# Patient Record
Sex: MALE | Race: OTHER | HISPANIC OR LATINO | URBAN - METROPOLITAN AREA
[De-identification: names, ages, dates, MRNs, and addresses within clinical notes are randomized per-mention and may not be internally consistent; named-entity substitution may affect disease eponyms.]

---

## 2022-03-29 ENCOUNTER — EMERGENCY (EMERGENCY)
Facility: HOSPITAL | Age: 57
LOS: 0 days | Discharge: HOME | End: 2022-03-29
Attending: EMERGENCY MEDICINE | Admitting: EMERGENCY MEDICINE
Payer: SELF-PAY

## 2022-03-29 VITALS
SYSTOLIC BLOOD PRESSURE: 158 MMHG | TEMPERATURE: 98 F | RESPIRATION RATE: 18 BRPM | DIASTOLIC BLOOD PRESSURE: 78 MMHG | WEIGHT: 175.05 LBS | HEART RATE: 85 BPM | OXYGEN SATURATION: 100 % | HEIGHT: 67 IN

## 2022-03-29 DIAGNOSIS — Z91.013 ALLERGY TO SEAFOOD: ICD-10-CM

## 2022-03-29 DIAGNOSIS — Z23 ENCOUNTER FOR IMMUNIZATION: ICD-10-CM

## 2022-03-29 DIAGNOSIS — S41.111A LACERATION WITHOUT FOREIGN BODY OF RIGHT UPPER ARM, INITIAL ENCOUNTER: ICD-10-CM
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
## 2022-03-29 DIAGNOSIS — S60.511A ABRASION OF RIGHT HAND, INITIAL ENCOUNTER: ICD-10-CM
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
## 2022-03-29 PROCEDURE — 12002 RPR S/N/AX/GEN/TRNK2.6-7.5CM: CPT

## 2022-03-29 PROCEDURE — 99283 EMERGENCY DEPT VISIT LOW MDM: CPT | Mod: 25

## 2022-03-29 RX ORDER — TETANUS TOXOID, REDUCED DIPHTHERIA TOXOID AND ACELLULAR PERTUSSIS VACCINE, ADSORBED 5; 2.5; 8; 8; 2.5 [IU]/.5ML; [IU]/.5ML; UG/.5ML; UG/.5ML; UG/.5ML
0.5 SUSPENSION INTRAMUSCULAR ONCE
Refills: 0 | Status: COMPLETED | OUTPATIENT
Start: 2022-03-29 | End: 2022-03-29

## 2022-03-29 RX ADMIN — TETANUS TOXOID, REDUCED DIPHTHERIA TOXOID AND ACELLULAR PERTUSSIS VACCINE, ADSORBED 0.5 MILLILITER(S): 5; 2.5; 8; 8; 2.5 SUSPENSION INTRAMUSCULAR at 10:51

## 2022-03-29 RX ADMIN — Medication 1 TABLET(S): at 10:49

## 2022-03-29 NOTE — ED PROVIDER NOTE - NSFOLLOWUPINSTRUCTIONS_ED_ALL_ED_FT
Please come in 10-14 days to take out sutures    Laceration Care, Adult      A laceration is a cut that may go through all layers of the skin and into the tissue that is right under the skin. Some lacerations heal on their own. Others need to be closed with stitches (sutures), staples, skin adhesive strips, or skin glue. Proper care of a laceration reduces the risk for infection, helps the laceration heal better, and may prevent scarring.      How to care for your laceration    Wash your hands with soap and water before touching your wound or changing your bandage (dressing). If soap and water are not available, use hand .    Keep the wound clean and dry.    If you were given a dressing, you should change it at least once a day, or as told by your health care provider. You should also change it if it becomes wet or dirty.    If sutures or staples were used:     •Keep the wound completely dry for the first 24 hours, or as told by your health care provider. After that time, you may shower or bathe. However, make sure that the wound is not soaked in water until after the sutures or staples have been removed.    •Clean the wound once each day, or as told by your health care provider:  •Wash the wound with soap and water.      •Rinse the wound with water to remove all soap.      •Pat the wound dry with a clean towel. Do not rub the wound.        •After cleaning the wound, apply a thin layer of antibiotic ointment as told by your health care provider. This will help prevent infection and keep the dressing from sticking to the wound.      •Have the sutures or staples removed as told by your health care provider.      If skin adhesive strips were used:     • Do not get the skin adhesive strips wet. You may shower or bathe, but be careful to keep the wound dry.      •If the wound gets wet, pat it dry with a clean towel. Do not rub the wound.      •Skin adhesive strips fall off on their own. You may trim the strips as the wound heals. Do not remove skin adhesive strips that are still stuck to the wound. They will fall off in time.      If skin glue was used:     •Try to keep the wound dry, but you may briefly wet it in the shower or bath. Do not soak the wound in water, such as by swimming.      •After you have showered or bathed, gently pat the wound dry with a clean towel. Do not rub the wound.      • Do not do any activities that will make you sweat heavily until the skin glue has fallen off on its own.      • Do not apply liquid, cream, or ointment medicine to the wound while the skin glue is in place. Using those may loosen the film before the wound has healed.      •If a dressing is placed over the wound, be careful not to apply tape directly over the skin glue. Doing that may cause the glue to be pulled off before the wound has healed.      • Do not pick at the glue. Skin glue usually remains in place for 5–10 days and then falls off the skin.        General instructions      •Take over-the-counter and prescription medicines only as told by your health care provider.      •If you were prescribed an antibiotic medicine or ointment, take or apply it as told by your health care provider. Do not stop using it even if your condition improves.      • Do not scratch or pick at the wound.    •Check your wound every day for signs of infection. Watch for:  •Redness, swelling, or pain.      •Fluid, blood, or pus.        •Raise (elevate) the injured area above the level of your heart while you are sitting or lying down for the first 24–48 hours after the laceration is repaired.    •If directed, put ice on the affected area:  •Put ice in a plastic bag.      •Place a towel between your skin and the bag.      •Leave the ice on for 20 minutes, 2–3 times a day.        •Keep all follow-up visits as told by your health care provider. This is important.        Contact a health care provider if:    •You received a tetanus shot and you have swelling, severe pain, redness, or bleeding at the injection site.      •You have a fever.      •A wound that was closed breaks open.      •You notice a bad smell coming from your wound or your dressing.      •You notice something coming out of the wound, such as wood or glass.      •Your pain is not controlled with medicine.      •You have increased redness, swelling, or pain at the site of your wound.      •You have fluid, blood, or pus coming from your wound.      •You need to change the dressing often due to fluid, blood, or pus that is draining from the wound.      •You develop a new rash.      •You develop numbness around the wound.        Get help right away if:    •You develop severe swelling around the wound.      •Your pain suddenly increases and is severe.      •You develop painful lumps near the wound or on skin anywhere else on your body.      •You have a red streak going away from your wound.      •The wound is on your hand or foot and you cannot properly move a finger or toe.      •The wound is on your hand or foot, and you notice that your fingers or toes look pale or bluish.        Summary    •A laceration is a cut that may go through all layers of the skin and into the tissue that is right under the skin.      •Some lacerations heal on their own. Others need to be closed with stitches (sutures), staples, skin adhesive strips, or skin glue.      •Proper care of a laceration reduces the risk of infection, helps the laceration heal better, and prevents scarring.      This information is not intended to replace advice given to you by your health care provider. Make sure you discuss any questions you have with your health care provider.

## 2022-03-29 NOTE — ED PROVIDER NOTE - OBJECTIVE STATEMENT
56 y m, no pmh, pw animal bite/scratch. Endorsed gardening in yard when squirrel jumped onto his rt arm, tried shaking it off, squirrel panicked and scratched his right biceps, +lac, +abrasion to hand, ?bite. No injury pain elsewhere.

## 2022-03-29 NOTE — ED PROVIDER NOTE - ATTENDING CONTRIBUTION TO CARE
I was present for and supervised the key and critical aspects of the procedures performed during the care of the patient. Patient sustained a laceration to right arm after gardening he is unsure of bite vs. scratch vs. abrasion after an encounter with a squirrel  he has a 3 cm linear laceration to right biceps no signs of infection or weakness we repaired laceration   started on antibiotics.

## 2022-03-29 NOTE — ED PROVIDER NOTE - CLINICAL SUMMARY MEDICAL DECISION MAKING FREE TEXT BOX
patient had successful closure of right upper extremity laceration.  with no signs of cellulitis started on po antibiotics with no evidence of weakness patient utd with tetanus I will discharge at this time follow up to his pmd

## 2022-03-29 NOTE — ED PROVIDER NOTE - PHYSICAL EXAMINATION
CONSTITUTIONAL: NAD  SKIN: Warm dry  HEAD: NCAT  EYES: NL inspection  ENT: MMM  NECK: Supple; non tender.  CARD: RRR  RESP: CTAB  ABD: S/NT no R/G  EXT: multiple abrasion on rt hand, clean. 3cm lac on rt bicep.   NEURO: Grossly unremarkable  PSYCH: Cooperative, appropriate.

## 2022-03-29 NOTE — ED PROVIDER NOTE - PATIENT PORTAL LINK FT
You can access the FollowMyHealth Patient Portal offered by [facility] by registering at the following website: [email or web address]. By joining [facility]’s FollowMyHealth portal, you will also be able to view your health information using other applications (apps) compatible with our system.